# Patient Record
Sex: FEMALE | URBAN - NONMETROPOLITAN AREA
[De-identification: names, ages, dates, MRNs, and addresses within clinical notes are randomized per-mention and may not be internally consistent; named-entity substitution may affect disease eponyms.]

---

## 2021-07-23 ENCOUNTER — APPOINTMENT (OUTPATIENT)
Dept: URBAN - NONMETROPOLITAN AREA CLINIC 53 | Age: 86
Setting detail: DERMATOLOGY
End: 2021-07-26

## 2021-07-23 DIAGNOSIS — L57.8 OTHER SKIN CHANGES DUE TO CHRONIC EXPOSURE TO NONIONIZING RADIATION: ICD-10-CM

## 2021-07-23 DIAGNOSIS — D485 NEOPLASM OF UNCERTAIN BEHAVIOR OF SKIN: ICD-10-CM

## 2021-07-23 PROBLEM — D48.5 NEOPLASM OF UNCERTAIN BEHAVIOR OF SKIN: Status: ACTIVE | Noted: 2021-07-23

## 2021-07-23 PROCEDURE — 99202 OFFICE O/P NEW SF 15 MIN: CPT | Mod: 25

## 2021-07-23 PROCEDURE — OTHER BIOPSY BY SHAVE METHOD: OTHER

## 2021-07-23 PROCEDURE — OTHER COUNSELING: OTHER

## 2021-07-23 PROCEDURE — 11102 TANGNTL BX SKIN SINGLE LES: CPT

## 2021-07-23 ASSESSMENT — LOCATION ZONE DERM: LOCATION ZONE: NOSE

## 2021-07-23 ASSESSMENT — LOCATION SIMPLE DESCRIPTION DERM: LOCATION SIMPLE: NOSE

## 2021-07-23 ASSESSMENT — LOCATION DETAILED DESCRIPTION DERM
LOCATION DETAILED: NASAL DORSUM
LOCATION DETAILED: NASAL SUPRATIP

## 2021-07-23 NOTE — PROCEDURE: BIOPSY BY SHAVE METHOD
Bill 10307 For Specimen Handling/Conveyance To Laboratory?: no
Depth Of Biopsy: dermis
Anesthesia Type: 1% lidocaine with epinephrine
Silver Nitrate Text: The wound bed was treated with silver nitrate after the biopsy was performed.
Was A Bandage Applied: Yes
Curettage Text: The wound bed was treated with curettage after the biopsy was performed.
Dressing: bandage
Anesthesia Volume In Cc (Will Not Render If 0): 0.5
Additional Anesthesia Volume In Cc (Will Not Render If 0): 0
Electrodesiccation And Curettage Text: The wound bed was treated with electrodesiccation and curettage after the biopsy was performed.
Biopsy Method: Dermablade
Billing Type: Third-Party Bill
Biopsy Type: H and E
Detail Level: Detailed
Hemostasis: Drysol
Electrodesiccation Text: The wound bed was treated with electrodesiccation after the biopsy was performed.
Size Of Lesion In Cm: 0.6
Information: Selecting Yes will display possible errors in your note based on the variables you have selected. This validation is only offered as a suggestion for you. PLEASE NOTE THAT THE VALIDATION TEXT WILL BE REMOVED WHEN YOU FINALIZE YOUR NOTE. IF YOU WANT TO FAX A PRELIMINARY NOTE YOU WILL NEED TO TOGGLE THIS TO 'NO' IF YOU DO NOT WANT IT IN YOUR FAXED NOTE.
Notification Instructions: Patient will be notified of biopsy results. However, patient instructed to call the office if not contacted within 2 weeks.
Cryotherapy Text: The wound bed was treated with cryotherapy after the biopsy was performed.
Consent: Written consent was obtained and risks were reviewed including but not limited to scarring, infection, bleeding, scabbing, incomplete removal, nerve damage and allergy to anesthesia.
Type Of Destruction Used: Curettage
Wound Care: Petrolatum
Post-Care Instructions: I reviewed with the patient in detail post-care instructions. Patient is to keep the biopsy site dry overnight, and then apply bacitracin twice daily until healed. Patient may apply hydrogen peroxide soaks to remove any crusting.

## 2021-10-20 ENCOUNTER — APPOINTMENT (OUTPATIENT)
Dept: URBAN - NONMETROPOLITAN AREA CLINIC 51 | Age: 86
Setting detail: DERMATOLOGY
End: 2021-10-20

## 2021-10-20 VITALS — HEIGHT: 64 IN | WEIGHT: 156 LBS

## 2021-12-07 ENCOUNTER — APPOINTMENT (OUTPATIENT)
Dept: URBAN - METROPOLITAN AREA CLINIC 286 | Age: 86
Setting detail: DERMATOLOGY
End: 2021-12-13

## 2021-12-07 DIAGNOSIS — L57.0 ACTINIC KERATOSIS: ICD-10-CM

## 2021-12-07 PROBLEM — C44.311 BASAL CELL CARCINOMA OF SKIN OF NOSE: Status: ACTIVE | Noted: 2021-12-07

## 2021-12-07 PROCEDURE — OTHER TREATMENT REGIMEN: OTHER

## 2021-12-07 PROCEDURE — OTHER PRESCRIPTION: OTHER

## 2021-12-07 PROCEDURE — OTHER MOHS SURGERY: OTHER

## 2021-12-07 PROCEDURE — 17312 MOHS ADDL STAGE: CPT

## 2021-12-07 PROCEDURE — OTHER COUNSELING: OTHER

## 2021-12-07 PROCEDURE — 17311 MOHS 1 STAGE H/N/HF/G: CPT

## 2021-12-07 PROCEDURE — 15260 FTH/GFT FR N/E/E/L 20 SQCM/<: CPT

## 2021-12-07 PROCEDURE — A4550 SURGICAL TRAYS: HCPCS

## 2021-12-07 RX ORDER — DOXYCYCLINE HYCLATE 100 MG/1
CAPSULE, GELATIN COATED ORAL
Qty: 14 | Refills: 0 | Status: ERX | COMMUNITY
Start: 2021-12-07

## 2021-12-07 NOTE — PROCEDURE: TREATMENT REGIMEN
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 Will prescribe 5FU BID x4 weeks to the entire nose at bolster removal. Patient to begin treatment first week of February 2022
Detail Level: Zone

## 2021-12-07 NOTE — PROCEDURE: MOHS SURGERY
Low-FODMAP Eating Plan    FODMAP stands for fermentable oligosaccharides, disaccharides, monosaccharides, and polyols. These are sugars that are hard for some people to digest. A low-FODMAP eating plan may help some people who have irritable bowel syndrome (IBS) and certain other bowel (intestinal) diseases to manage their symptoms.  This meal plan can be complicated to follow. Work with a diet and nutrition specialist (dietitian) to make a low-FODMAP eating plan that is right for you. A dietitian can help make sure that you get enough nutrition from this diet.  What are tips for following this plan?  Reading food labels  · Check labels for hidden FODMAPs such as:  ? High-fructose syrup.  ? Honey.  ? Agave.  ? Natural fruit flavors.  ? Onion or garlic powder.  · Choose low-FODMAP foods that contain 3-4 grams of fiber per serving.  · Check food labels for serving sizes. Eat only one serving at a time to make sure FODMAP levels stay low.  Shopping  · Shop with a list of foods that are recommended on this diet and make a meal plan.  Meal planning  · Follow a low-FODMAP eating plan for up to 6 weeks, or as told by your health care provider or dietitian.  · To follow the eating plan:  1. Eliminate high-FODMAP foods from your diet completely. Choose only low-FODMAP foods to eat. You will do this for 2-6 weeks.  2. Gradually reintroduce high-FODMAP foods into your diet one at a time. Most people should wait a few days before introducing the next new high-FODMAP food into their meal plan. Your dietitian can recommend how quickly you may reintroduce foods.  3. Keep a daily record of what and how much you eat and drink. Make note of any symptoms that you have after eating.  4. Review your daily record with a dietitian regularly to identify which foods you can eat and which foods you should avoid.  General tips  · Drink enough fluid each day to keep your urine pale yellow.  · Avoid processed foods. These often have added sugar  "and may be high in FODMAPs.  · Avoid most dairy products, whole grains, and sweeteners.  · Work with a dietitian to make sure you get enough fiber in your diet.  · Avoid high FODMAP foods at meals to manage symptoms.  Recommended foods  Fruits  Bananas, oranges, tangerines, deann, limes, blueberries, raspberries, strawberries, grapes, cantaloupe, honeydew melon, kiwi, papaya, passion fruit, and pineapple. Limited amounts of dried cranberries, banana chips, and shredded coconut.  Vegetables  Eggplant, zucchini, cucumber, peppers, green beans, bean sprouts, lettuce, arugula, kale, Swiss chard, spinach, terrance greens, bok laura, summer squash, potato, and tomato. Limited amounts of corn, carrot, and sweet potato. Green parts of scallions.  Grains  Gluten-free grains, such as rice, oats, buckwheat, quinoa, corn, polenta, and millet. Gluten-free pasta, bread, or cereal. Rice noodles. Corn tortillas.  Meats and other proteins  Unseasoned beef, pork, poultry, or fish. Eggs. Hogan. Tofu (firm) and tempeh. Limited amounts of nuts and seeds, such as almonds, walnuts, brazil nuts, pecans, peanuts, nut butters, pumpkin seeds, merle seeds, and sunflower seeds.  Dairy  Lactose-free milk, yogurt, and kefir. Lactose-free cottage cheese and ice cream. Non-dairy milks, such as almond, coconut, hemp, and rice milk. Non-dairy yogurt. Limited amounts of goat cheese, brie, mozzarella, parmesan, swiss, and other hard cheeses.  Fats and oils  Butter-free spreads. Vegetable oils, such as olive, canola, and sunflower oil.  Seasoning and other foods  Artificial sweeteners with names that do not end in \"ol,\" such as aspartame, saccharine, and stevia. Maple syrup, white table sugar, raw sugar, brown sugar, and molasses. Mayonnaise, soy sauce, and tamari. Fresh basil, coriander, parsley, rosemary, and thyme.  Beverages  Water and mineral water. Sugar-sweetened soft drinks. Small amounts of orange juice or cranberry juice. Black and green tea. " Most dry artem. Coffee.  The items listed above may not be a complete list of foods and beverages you can eat. Contact a dietitian for more information.  Foods to avoid  Fruits  Fresh, dried, and juiced forms of apple, pear, watermelon, peach, plum, cherries, apricots, blackberries, boysenberries, figs, nectarines, and ellie. Avocado.  Vegetables  Chicory root, artichoke, asparagus, cabbage, snow peas, Blair sprouts, broccoli, sugar snap peas, mushrooms, celery, and cauliflower. Onions, garlic, leeks, and the white part of scallions.  Grains  Wheat, including kamut, durum, and semolina. Barley and bulgur. Couscous. Wheat-based cereals. Wheat noodles, bread, crackers, and pastries.  Meats and other proteins  Fried or fatty meat. Sausage. Cashews and pistachios. Soybeans, baked beans, black beans, chickpeas, kidney beans, john beans, navy beans, lentils, black-eyed peas, and split peas.  Dairy  Milk, yogurt, ice cream, and soft cheese. Cream and sour cream. Milk-based sauces. Custard. Buttermilk. Soy milk.  Seasoning and other foods  Any sugar-free gum or candy. Foods that contain artificial sweeteners such as sorbitol, mannitol, isomalt, or xylitol. Foods that contain honey, high-fructose corn syrup, or agave. Bouillon, vegetable stock, beef stock, and chicken stock. Garlic and onion powder. Condiments made with onion, such as hummus, chutney, pickles, relish, salad dressing, and salsa. Tomato paste.  Beverages  Chicory-based drinks. Coffee substitutes. Chamomile tea. Fennel tea. Sweet or fortified artem such as port or cassandra. Diet soft drinks made with isomalt, mannitol, maltitol, sorbitol, or xylitol. Apple, pear, and ellie juice. Juices with high-fructose corn syrup.  The items listed above may not be a complete list of foods and beverages you should avoid. Contact a dietitian for more information.  Summary  · FODMAP stands for fermentable oligosaccharides, disaccharides, monosaccharides, and polyols. These  are sugars that are hard for some people to digest.  · A low-FODMAP eating plan is a short-term diet that helps to ease symptoms of certain bowel diseases.  · The eating plan usually lasts up to 6 weeks. After that, high-FODMAP foods are reintroduced gradually and one at a time. This can help you find out which foods may be causing symptoms.  · A low-FODMAP eating plan can be complicated. It is best to work with a dietitian who has experience with this type of plan.  This information is not intended to replace advice given to you by your health care provider. Make sure you discuss any questions you have with your health care provider.  Document Revised: 05/06/2021 Document Reviewed: 05/06/2021  [employer]vier Patient Education © 2021 [employer]     Provider Procedure Text (F): After obtaining clear surgical margins the defect was repaired by another provider.

## 2021-12-14 ENCOUNTER — APPOINTMENT (OUTPATIENT)
Dept: URBAN - METROPOLITAN AREA CLINIC 286 | Age: 86
Setting detail: DERMATOLOGY
End: 2021-12-19

## 2021-12-14 DIAGNOSIS — L57.0 ACTINIC KERATOSIS: ICD-10-CM

## 2021-12-14 DIAGNOSIS — Z48.02 ENCOUNTER FOR REMOVAL OF SUTURES: ICD-10-CM

## 2021-12-14 PROCEDURE — OTHER SUTURE REMOVAL (GLOBAL PERIOD): OTHER

## 2021-12-14 PROCEDURE — OTHER PATIENT SPECIFIC COUNSELING: OTHER

## 2021-12-14 PROCEDURE — OTHER TREATMENT REGIMEN: OTHER

## 2021-12-14 PROCEDURE — 99212 OFFICE O/P EST SF 10 MIN: CPT | Mod: 24

## 2021-12-14 PROCEDURE — OTHER MEDICATION COUNSELING: OTHER

## 2021-12-14 PROCEDURE — OTHER PRESCRIPTION: OTHER

## 2021-12-14 PROCEDURE — OTHER COUNSELING: OTHER

## 2021-12-14 RX ORDER — FLUOROURACIL 5 MG/G
CREAM TOPICAL
Qty: 40 | Refills: 0 | Status: ERX | COMMUNITY
Start: 2021-12-14

## 2021-12-14 RX ORDER — MUPIROCIN 20 MG/G
OINTMENT TOPICAL
Qty: 15 | Refills: 0 | Status: ERX | COMMUNITY
Start: 2021-12-14

## 2021-12-14 ASSESSMENT — LOCATION SIMPLE DESCRIPTION DERM: LOCATION SIMPLE: NOSE

## 2021-12-14 ASSESSMENT — LOCATION ZONE DERM: LOCATION ZONE: NOSE

## 2021-12-14 ASSESSMENT — LOCATION DETAILED DESCRIPTION DERM: LOCATION DETAILED: NASAL DORSUM

## 2021-12-14 NOTE — PROCEDURE: PATIENT SPECIFIC COUNSELING
Detail Level: Zone
As there is evidence of honey color crusting the patient will begin mupirocin ointment once daily to her nose for the next few weeks.

## 2021-12-14 NOTE — PROCEDURE: SUTURE REMOVAL (GLOBAL PERIOD)
Add 70768 Cpt? (Important Note: In 2017 The Use Of 13803 Is Being Tracked By Cms To Determine Future Global Period Reimbursement For Global Periods): no
Detail Level: Detailed

## 2021-12-14 NOTE — PROCEDURE: MEDICATION COUNSELING
Xelmeirz Pregnancy And Lactation Text: This medication is Pregnancy Category D and is not considered safe during pregnancy.  The risk during breast feeding is also uncertain.

## 2021-12-14 NOTE — PROCEDURE: TREATMENT REGIMEN
Detail Level: Zone
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 Will prescribe 5FU BID x4 weeks. Patient to begin treatment first week of February 2022

## 2024-05-10 NOTE — PROCEDURE: MOHS SURGERY
I will STOP taking the medications listed below when I get home from the hospital:  None Epidermal Closure: simple interrupted